# Patient Record
Sex: MALE | Race: BLACK OR AFRICAN AMERICAN | NOT HISPANIC OR LATINO | URBAN - METROPOLITAN AREA
[De-identification: names, ages, dates, MRNs, and addresses within clinical notes are randomized per-mention and may not be internally consistent; named-entity substitution may affect disease eponyms.]

---

## 2023-12-01 ENCOUNTER — EMERGENCY (EMERGENCY)
Facility: HOSPITAL | Age: 28
LOS: 1 days | Discharge: HOME | End: 2023-12-01
Attending: EMERGENCY MEDICINE | Admitting: EMERGENCY MEDICINE
Payer: MEDICAID

## 2023-12-01 VITALS
TEMPERATURE: 98 F | SYSTOLIC BLOOD PRESSURE: 132 MMHG | DIASTOLIC BLOOD PRESSURE: 78 MMHG | OXYGEN SATURATION: 100 % | RESPIRATION RATE: 18 BRPM | HEART RATE: 81 BPM

## 2023-12-01 PROCEDURE — 99284 EMERGENCY DEPT VISIT MOD MDM: CPT

## 2023-12-01 NOTE — ED PROVIDER NOTE - CLINICAL SUMMARY MEDICAL DECISION MAKING FREE TEXT BOX
27 y/o  M hx MDD   Medical evaluation performed. There is no clinical evidence of intoxication or any acute medical problem requiring immediate intervention. Patient is awaiting psychiatric consultation. Final disposition will be determined by psychiatrist.

## 2023-12-01 NOTE — ED ADULT NURSE NOTE - OBJECTIVE STATEMENT
alert oriented calm at present has no complaints denies SI or HI at present   states people at his home are harassing him

## 2023-12-01 NOTE — ED ADULT NURSE NOTE - CHIEF COMPLAINT QUOTE
psych eval    pt from bldg 20 at [facility]. as per police, pt text gf he "made up his mind'.  has history of suicide attempt.. gf itnerpreted that as suicidal ideation. pt states it was about work.  attempted to od several months ago.  denies suicidal homicidal ideation, drugs/etoh, auditory visual hallucinations. past medical history- ptsd, personality disorder, depression

## 2023-12-01 NOTE — ED BEHAVIORAL HEALTH NOTE - BEHAVIORAL HEALTH NOTE
Pt arrives from [facility] (BLDG 20) #[phone]. Writer contacted [facility] and spoke with Melinda Gomez (Care ) who provided the following information:    Pt admitted to respite on 11/7 following hospital discharge.  reports pt hospitalized for SI unaware of details with hx of 4 past SI gestures via cutting and possible ingestion of medication. Pt is due to be d/chao from respite on Monday. Worker reports that pt's psychiatrist outreached  reporting that pt was feeling hopeless in context of upcoming d/c and feeling like nobody cares. Pt known to be otherwise homeless with housing instability. Worker reports that they then received call from pt's girlfriend who reported that pt texted her saying "I made up my mind" and that he loves her and baby (unborn child). Pt then shut off phone and was unable to be contacted. Pt later returned to respite and was said to be closed off to staff. Pt did not want to talk about what happened. Pt was then sent to ED for SI related concerns. Pt would be able to return to respite if cleared for discharge.

## 2023-12-01 NOTE — ED PROVIDER NOTE - OBJECTIVE STATEMENT
29 y/o  M hx MDD  BIBA secondary to concerns overs suicidal ideations.  Collateral from [facility] states that patient verbalized suicidal ideations and send a good bye text to his girlfriend.   Patient denies SI/AH/HI/AV. Denies pain, SOB, fever, chills, chest, abdominal discomfort. Denies falling, or kicking any objects. Denies use of alochol or other  illicit drugs. No evidence of physical injuries, broken skin or deformities.

## 2023-12-01 NOTE — ED ADULT TRIAGE NOTE - CHIEF COMPLAINT QUOTE
psych eval    pt from bldg 20 at [facility]. as per police, pt text gf he "made up his mind'.  has history of suicide attempt.. gf itnerpreted that as suicidal ideation. pt states it was about work.  attempted to od several months ago.  denies suicidal homicidal ideation, drugs/etoh, auditory visual hallucinations. past medical history- ptsd, personality disorder, depression

## 2023-12-01 NOTE — ED PROVIDER NOTE - NSFOLLOWUPINSTRUCTIONS_ED_ALL_ED_FT
You have been given information necessary to follow up with the  [facility] ([facility]) Crisis center & other outpatient  psychiatric clinics within your community    • [facility] walk in Crisis centre  [address] [place] [address] [phone] [email or web address]  Hours of operation:  Day	                                        Hours  Sunday                                  Closed  Monday                                9am - 3pm  Tuesday                                9am - 3pm  Wednesday                          9am - 3pm  Thursday                               9am - 3pm  Friday                                    9am - 3pm  Saturday                                Closed    .....additionally if your current problem is associated with drug or alcohol abuse further information can be obtained at the Drug Abuse Evaluation Health Referral Servce ([facility])    • [facility] [address] [place] [address] [phone] [email or web address]    Additionally if more support and information and help is needed in the area of suicide prevention pleas3 feel free to contact :   • Su[place] [address]  Phone: 8-947-066-BRNT (5260)  Web Address: http://www.suicidepreventionlifeline.org  • Suicide Awareness Voices of Education  [address]  [place], [place]  Phone: 1-570.763.5374  Web Address: http://www.save.org    Depression    WHAT YOU NEED TO KNOW:  Depression is a medical condition that causes feelings of sadness or hopelessness that do not go away. Depression may cause you to lose interest in things you used to enjoy. These feelings may interfere with your daily life.  DISCHARGE INSTRUCTIONS:  Call your local emergency number (911 in the [place]) if:   •You think about harming yourself or someone else.  •You have done something on purpose to hurt yourself.  Call your therapist or doctor if:   •Your symptoms do not improve.  •You cannot make it to your next appointment.   •You have new symptoms.  •You have questions or concerns about your condition or care.  The following resources are available at any time to help you, if needed:   •National Suicide Prevention Lifeline: 1-211.596.9495 (0-091-518-TALK)  •Suicide Hotline: 1-429.465.1396 (4-056-OBNXKNS)  •For a list of international numbers: https://save.org/find-help/international-resources/  Medicines:   •Antidepressants may be given to improve or balance your mood. You may need to take this medicine for several weeks before you begin to feel better.  •Take your medicine as directed. Contact your healthcare provider if you think your medicine is not helping or if you have side effects. Tell him of her if you are allergic to any medicine. Keep a list of the medicines, vitamins, and herbs you take. Include the amounts, and when and why you take them. Bring the list or the pill bottles to follow-up visits. Carry your medicine list with you in case of an emergency.  Therapy is often used together with medicine to relieve depression. Therapy is a way for you to talk about your feelings and anything that may be causing depression. Therapy can be done alone or in a group. It may also be done with family members or a significant other.    Self-care:   •Get regular physical activity. Try to be active for 30 minutes, 3 to 5 days a week. Physical activity can help relieve depression. Work with your healthcare provider to develop a plan that you enjoy. It may help to ask someone to be active with you.  •Create a regular sleep schedule. A routine can help you relax before bed. Listen to music, read, or do yoga. Try to go to bed and wake up at the same time every day. Sleep is important for emotional health.  •Eat a variety of healthy foods. Healthy foods include fruits, vegetables, whole-grain breads, low-fat dairy products, lean meats, fish, and cooked beans. A healthy meal plan is low in fat, salt, and added sugar.  •Do not drink alcohol or use drugs. Alcohol and drugs can make depression worse. Talk to your therapist or doctor if you need help quitting.  Follow up with your healthcare provider as directed: Your healthcare provider will monitor your progress at follow-up visits. He or she will also monitor your medicine if you take antidepressants. Your healthcare provider will ask if the medicine is helping. Tell him or her about any side effects or problems you may have with your medicine. The type or amount of medicine may need to be changed. Write down your questions so you remember to ask them during your visits.    Suicide Prevention  WHAT YOU NEED TO KNOW:  You may see suicide as the only way to escape emotional or physical pain and suffering. Help is available from people who care about you, and from professionals trained in suicide prevention. Prevention includes everything you and others can do to stop you from taking your life.  DISCHARGE INSTRUCTIONS:  Call your local emergency number (911 in the ), or ask someone to call if:   •You do something on purpose to hurt yourself  •You make a plan to commit suicide.  Call your doctor or therapist, or have someone close to you call if:   •You act out in anger, are reckless, or are abusing alcohol or drugs.  •You have serious thoughts of suicide, even with treatment.  •You have more thoughts of suicide when you are alone.  •You stop eating, or you begin to smoke cigarettes or drink alcohol.  •You have questions or concerns about your condition or care.  What to do if you are considering suicide:    •Contact a suicide prevention organization. The following are always available to help you: ?National Suicide Prevention Lifeline: 1-604.113.7970 (3-470-940-TALK)  ?Suicide Hotline: 1-974.439.6600 (6-162-RYVCRHB)  ?For a list of international numbers: https://save.org/find-help/international-resources/  •Contact your therapist. Your doctor can give you a list of therapists if you do not have one.  •Keep medicines, weapons, and alcohol out of your home.  •Do not spend time alone if you have thoughts of ending your life.  Warning signs of suicide: The following can help you and others recognize that you are struggling:   •Talking about your plan for committing suicide, or wanting to read or write about death or suicide  •Cutting yourself, burning your skin with cigarettes, or driving recklessly  •Drug or alcohol use, not taking your prescribed medicine, or taking too much  •Not wanting to spend time with others or doing things you enjoy, feeling bored, or not wanting anyone to praise you  •Changes in your appetite, sleep habits, energy levels, or weight  •Feeling angry, or lashing out at others  •A need to give away or throw away your belonging  •Often skipping work  •Suddenly not taking medicine for a mental illness without talking to your healthcare provider  •Suddenly not going to therapy  Treatment for suicidal thoughts:   •Medicines may be given to prevent mood swings, or to decrease anxiety or depression. You will need to take all medicines as directed. A sudden stop can be harmful. It may take 4 to 6 weeks for the medicine to help you feel better.  •Suicide risk assessment means healthcare providers will ask questions about your suicide thoughts and plans. They will ask how often you think about suicide and if you have tried it before. They will ask if you have begun to hurt yourself, such as with cutting or reckless driving. They may ask if you have access to weapons or drugs.  •A safety plan includes a list of people or groups to contact if you have suicidal feelings again. The list may include friends, family members, a spiritual leader, and others you trust. You may be asked to make a verbal agreement or sign a contract that you will not try to harm yourself.  •A therapist can help you identify and change negative feelings or beliefs about yourself. This may help change the way you feel and act. A therapist can also help you find ways to cope with things that cannot be changed.  Manage depression:   •Get help for drug or alcohol abuse. Drugs and alcohol can make suicidal feelings worse and make you more likely to act on them. Drugs and alcohol can also cause or increase depression.  •Talk to someone you trust. Be honest about your thoughts and feelings about suicide. You can call a suicide prevention center if you do not want to talk to someone you know.  •Exercise as directed. Exercise can lift your mood, give you more energy, and make it easier to sleep.   •Eat a variety of healthy foods. Healthy foods include fruits, vegetables, whole-grain breads, lean meats, fish, low-fat dairy products, and beans. Try to eat regularly even if you do not feel hungry. Depression can increase from a lack of nutrition or if you are hungry for long periods of time.  •Create a sleep routine. Try to go to bed and wake up at the same time every day. Let your healthcare provider know if you are having trouble sleeping.  •Take your medicine and go to therapy as directed. Medicine and therapy can help you manage your mental health. Do not stop taking your medicine without talking to your healthcare provider. If you do not like the way a medicine makes you feel, you may be able to try a different medicine.    **Follow up with your doctor or therapist as directed: Write down your questions so you remember to ask them during your visits.**

## 2023-12-01 NOTE — ED PROVIDER NOTE - PATIENT PORTAL LINK FT
You can access the FollowMyHealth Patient Portal offered by [facility] by registering at the following website: [email or web address]. By joining [employer]’s FollowMyHealth portal, you will also be able to view your health information using other applications (apps) compatible with our system.

## 2023-12-01 NOTE — ED ADULT NURSE NOTE - NSFALLUNIVINTERV_ED_ALL_ED
Bed/Stretcher in lowest position, wheels locked, appropriate side rails in place/Call bell, personal items and telephone in reach/Instruct patient to call for assistance before getting out of bed/chair/stretcher/Non-slip footwear applied when patient is off stretcher/[place] to call system/Physically safe environment - no spills, clutter or unnecessary equipment/Purposeful proactive rounding/Room/bathroom lighting operational, light cord in reach

## 2023-12-02 DIAGNOSIS — F32.A DEPRESSION, UNSPECIFIED: ICD-10-CM

## 2023-12-02 DIAGNOSIS — F43.29 ADJUSTMENT DISORDER WITH OTHER SYMPTOMS: ICD-10-CM

## 2023-12-02 PROCEDURE — 90792 PSYCH DIAG EVAL W/MED SRVCS: CPT

## 2023-12-02 NOTE — ED BEHAVIORAL HEALTH NOTE - BEHAVIORAL HEALTH NOTE
As per Dr. Leija, pt is cleared to return to [facility]. Writer contacted respite at [phone] and spoke with Yas who was informed of pt's discharge. Pt is INDEPENDENT in travels. Writer arranged taxi p/up for pt at this time via [facility] online portal. Trip scheduled with [employer] #[phone] with invoice #[id number]. Verbal huddle occurred with interdisciplinary team.

## 2023-12-02 NOTE — ED BEHAVIORAL HEALTH ASSESSMENT NOTE - OTHER PAST PSYCHIATRIC HISTORY (INCLUDE DETAILS REGARDING ONSET, COURSE OF ILLNESS, INPATIENT/OUTPATIENT TREATMENT)
past psychiatric history of depression and anxiety, with past inpatient psychiatric admissions (as per patient most recently in January of 2023), with past suicide attempts (patient reports history of overdosing on pills and hx of interrupted SA via hanging in the park), history of self harm via cutting (patient reports most recently ~1 year ago, however, respite states he was discharged from a hospital 11/2?), currently follows with outpatient therapist (Dr Jermaine Monzon [phone]; last appointment was today with 2x/wk follow up) and psychiatrist (Dr Batsheva Silva), on Effexor 250mg PO with reported daily compliance

## 2023-12-02 NOTE — ED BEHAVIORAL HEALTH ASSESSMENT NOTE - RISK ASSESSMENT
Patient has chronic elevated risk for self harm given gender, past suicidal ideation / attempts, prior psychiatric hospitalizations, h/o psychiatric diagnosis  Patient also has dynamic risk factors due to active psychiatric sx, recent substance use, unemployment, transitional period, financial difficulty, acute life stressor   Risk factors however are currently mitigated by protective factors of age, race, being in a relationship, no current suicidal ideation, intact reality testing, no substance misuse, future orientation, judgement/ insight/impulse control intact, access to care, intact safety plan, committed to safety plan, denies access to weapons, connected to care , medication compliant.  Given these mitigating factors, the patient does not appear to be at imminent risk for harm to self or others at this time and does not meet criteria for involuntary / emergency inpatient psychiatric hospitalization and does not wish to pursue voluntary admission at this time.

## 2023-12-02 NOTE — ED BEHAVIORAL HEALTH ASSESSMENT NOTE - OTHER
in a relationship psychiatric residence [facility] ([facility]) CHRISTOPHER :  Reference #: [id number] respite staff neutral Sarecycline Counseling: Patient advised regarding possible photosensitivity and discoloration of the teeth, skin, lips, tongue and gums.  Patient instructed to avoid sunlight, if possible.  When exposed to sunlight, patients should wear protective clothing, sunglasses, and sunscreen.  The patient was instructed to call the office immediately if the following severe adverse effects occur:  hearing changes, easy bruising/bleeding, severe headache, or vision changes.  The patient verbalized understanding of the proper use and possible adverse effects of sarecycline.  All of the patient's questions and concerns were addressed. housing staff "sad, overwhelmed, worried"

## 2023-12-02 NOTE — ED BEHAVIORAL HEALTH ASSESSMENT NOTE - SUMMARY
Patient is a 28 y o male, undomiciled (previously staying with his aunt prior to respite), currently staying at [facility] ([facility] 20) until 12/4), in a relationship with significant other who is 15 weeks pregnant with their baby, unemployed (previously employed at [employer] until August), with no past medical history, with past psychiatric history of depression and anxiety, with past inpatient psychiatric admissions (as per patient most recently in January of 2023), with past suicide attempts (patient reports history of overdosing on pills and hx of interrupted SA via hanging in the park), history of self harm via cutting (patient reports most recently ~1 year ago, however, respite states he was discharged from a hospital 11/2?), currently follows with outpatient therapist and psychiatrist, on Effexor 250mg PO with reported daily compliance, who BIBEMS activated by [facility] after girlfriend advised of a text message that she construed to be with suicidal intent.    On evaluation, patient reports recent sad mood and feeling overwhelmed by numerous psychosocial stressors and feeling that he wants to give up on employment search that has been unsuccessful thus far.  Reports this was what he meant to convey to girlfriend when he sent the text message that prompted her to become concerned / alert respite about.  Patient repeatedly denies that text message was meant to convey that he was going to end his life and denies active suicidal / self harming ideation, intent or plan at time of evaluation and engages in safety planning of calling [phone] and returning to the ED should he have active suicidal thoughts; he is future oriented and is able to state reasons for living and does not presents w/ sx of acute depressive episode, neena or psychosis and thus does not meet criteria for involuntary inpatient psychiatric admission at this time and does not wish to pursue voluntary admission at this time.  Patient is advised of [employer] resource to help w/ employment and he reports plans to discuss housing options with his girlfriend tomorrow and follow through on supportive housing application that has been started to as a means to mitigate current stressors.    Recommendations:   -treat and release; no acute psychiatric internvetion indicated at this time  -continue home medications   -continue follow up with established outpatient mental health providers   -return to the ED / call 911/ [phone] / tell respite staff if experienceing suicidal / self harming ideation / intent or plan   -Patient is advised of [employer] resource to help w/ employment and he reports plans to discuss housing options with his girlfriend tomorrow and follow through on supportive housing application that has been started to as a means to mitigate current stressors.

## 2023-12-02 NOTE — ED BEHAVIORAL HEALTH ASSESSMENT NOTE - DESCRIPTION
Alert, calm and cooperative, without agitation or aggressive behavior.     ICU Vital Signs Last 24 Hrs  T(C): 36.8 (01 Dec 2023 22:08), Max: 36.8 (01 Dec 2023 22:08)  T(F): 98.2 (01 Dec 2023 22:08), Max: 98.2 (01 Dec 2023 22:08)  HR: 81 (01 Dec 2023 22:08) (81 - 81)  BP: 132/78 (01 Dec 2023 22:08) (132/78 - 132/78)  BP(mean): --  ABP: --  ABP(mean): --  RR: 18 (01 Dec 2023 22:08) (18 - 18)  SpO2: 100% (01 Dec 2023 22:08) (100% - 100%)    O2 Parameters below as of 01 Dec 2023 23:28  Patient On (Oxygen Delivery Method): room air n/a as per HPI

## 2023-12-02 NOTE — ED BEHAVIORAL HEALTH ASSESSMENT NOTE - HPI (INCLUDE ILLNESS QUALITY, SEVERITY, DURATION, TIMING, CONTEXT, MODIFYING FACTORS, ASSOCIATED SIGNS AND SYMPTOMS)
Patient is a 28 y o male, undomiciled [facility] currently staying at [facility] ([facility] until 12/4), in a relationship with significant other who is 15 weeks pregnant with their baby, unemployed (previously employed at [employer] until August), with no past medical history, with past psychiatric history of depression and anxiety, with past inpatient psychiatric admissions (as per patient most recently in January of 2023), with past suicide attempts (patient reports history of overdosing on pills and hx of interrupted SA via hanging in the park), history of self harm via cutting (patient reports most recently ~1 year ago), currently follows with outpatient therapist and psychiatrist, on Effexor 250mg PO with reported daily compliance, who BIBEMS activated by [facility] after girlfriend advised of a text message that she construed to be with suicidal intent.     On evaluation, patient is alert, calm and cooperative, initially guarded but more engaged and forthcoming as the evaluation progresses.  He reports that he sent a text message to his girlfriend that says "made up my mind."  Reports he did not have suicidal intent or plan with this text message and he was overwhelmed by recent financial, employment and housing stressors in context of a baby on the way and that he has been unsuccessful in finding employment despite ongoing efforts with interviews and sending out his resume.  Reports he felt like giving up and no longer putting in the effort in the job search particularly and that he did not mean that he was going to do something to end his life.  Reports he was feeling too overwhelmed and stressed to talk to his girlfriend after sending the text and that he then got on the subway and lost cell service after picking up his medications and that is the reason his partner was unable to reach him.  Reports that his mood in the last two weeks has been "drained, overwhelmed, sad, exhausted and worried" and attributes this to ongoing stressors around employment noted above.  Denies that mood has interfered with functioning, reports energy/concentration/Reports he has been having thoughts that he doesn't want to wake up but denies thoughts of wanting to end his life or harm himself.  Reports going to the gym, calling [phone], and reaching out to his therapist has been helpful with the thoughts. Patient is a 28 y o male, [facility]iciled [facility] at [facility] ([facility] until 12/4), in a relationship with significant other who is 15 weeks pregnant with their baby, unemployed (previously employed at [employer] until August), with no past medical history, with past psychiatric history of depression and anxiety, with past inpatient psychiatric admissions (as per patient most recently in January of 2023), with past suicide attempts (patient reports history of overdosing on pills and hx of interrupted SA via hanging in the park), history of self harm via cutting (patient reports most recently ~1 year ago), currently follows with outpatient therapist and psychiatrist, on Effexor 250mg PO with reported daily compliance, who BIBEMS activated by [facility] after girlfriend advised of a text message that she construed to be with suicidal intent.     On evaluation, patient is alert, calm and cooperative, initially guarded but more engaged and forthcoming as the evaluation progresses.  He reports that he sent a text message to his girlfriend that says "made up my mind."  Reports he did not have suicidal intent or plan with this text message and he was overwhelmed by recent financial, employment and housing stressors in context of a baby on the way and that he has been unsuccessful in finding employment despite ongoing efforts with interviews and sending out his resume.  Reports he felt like giving up and no longer putting in the effort in the job search particularly and that he did not mean that he was going to do something to end his life.  Reports he was feeling too overwhelmed and stressed to talk to his girlfriend after sending the text and that he then got on the subway and lost cell service after picking up his medications and that is the reason his partner was unable to reach him.  Reports that his mood in the last two weeks has been "drained, overwhelmed, sad, exhausted and worried" and attributes this to ongoing stressors around employment noted above.  Denies that mood has interfered with functioning, and continues to care for himself at baseline, reports energy/concentration/appetite at baseline.  Denies anhedonia or psychomotor retardation. Reports he has been having thoughts that he doesn't want to wake up but denies thoughts of wanting to end his life or harm himself.  Reports going to the gym, calling [phone], and reaching out to his therapist has been helpful with the thoughts.  Denies suicidal or self harming ideation, intent or plan at time of evaluation and reports that if that changes Patient is a 28 y o male, undomiciled (previously staying with his aunt prior to respite), currently staying at [facility] ([facility]) until 12/4), in a relationship with significant other who is 15 weeks pregnant with their baby, unemployed (previously employed at [employer] until August), with no past medical history, with past psychiatric history of depression and anxiety, with past inpatient psychiatric admissions (as per patient most recently in January of 2023), with past suicide attempts (patient reports history of overdosing on pills and hx of interrupted SA via hanging in the park), history of self harm via cutting (patient reports most recently ~1 year ago, however, respite states he was discharged from a hospital 11/2?), currently follows with outpatient therapist and psychiatrist, on Effexor 250mg PO with reported daily compliance, who BIBEMS activated by [facility] after girlfriend advised of a text message that she construed to be with suicidal intent.     On evaluation, patient is alert, calm and cooperative, initially guarded but more engaged and forthcoming as the evaluation progresses.  He reports that he sent a text message to his girlfriend that says "made up my mind."  Reports he did not have suicidal intent or plan with this text message and he was overwhelmed by recent financial, employment and housing stressors in context of a baby on the way and that he has been unsuccessful in finding employment despite ongoing efforts with interviews and sending out his resume.  Reports he felt like giving up and no longer putting in the effort in the job search particularly and that he did not mean that he was going to do something to end his life.  Reports he was feeling too overwhelmed and stressed to talk to his girlfriend after sending the text and that he then got on the subway and lost cell service after picking up his medication and that is the reason his partner was unable to reach him.  Repeatedly denies that text message was meant to convey that he was going to end his life. Reports that his mood in the last two weeks has been "drained, overwhelmed, sad, exhausted and worried" and attributes this to ongoing stressors around employment noted above.  Denies that mood has interfered with functioning, and continues to care for himself at baseline, reports energy/concentration/appetite/sleep at baseline.  Denies anhedonia or psychomotor retardation. Reports he has been having thoughts that he doesn't want to wake up but denies thoughts of wanting to end his life or harm himself.  Reports going to the gym, calling [phone], and reaching out to his therapist has been helpful with the thoughts.  Denies suicidal or self harming ideation, intent or plan at time of evaluation and reports that if that changes he would return to the emergency room as he feels he had a positive experience with writer and this ER setting compared to past experiences.  Reports that he has signed himself in voluntarily in the past but does not feel he needs to at this time.  Reports he wants to discussed housing options with his girlfriend tomorrow and where he will go after he is d/chao from respite, reports does not want to return w/ aunt as she is triggering for him and his suicadlity and reports that supportive housing option has to be finalized still.  Also reports that there is a gender reveal party for his baby tomorrow and he plans and is looking forward to going to that.  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  Denies sx of acute neena, psychosis.  Denies substance use.

## 2023-12-02 NOTE — ED BEHAVIORAL HEALTH ASSESSMENT NOTE - SAFETY PLAN ADDT'L DETAILS
Safety plan discussed with.../Education provided regarding environmental safety / lethal means restriction/Provision of National Suicide Prevention Lifeline 2-572-713-CWYS (3179)

## 2023-12-02 NOTE — ED BEHAVIORAL HEALTH NOTE - BEHAVIORAL HEALTH NOTE
Writer met with pt on unit to obtain number for significant other. Pt hesitant at first reports number only in phone. PCA staff assisted writer in obtaining pt phone. Pt provided number for significant other, Kati, at [phone]. Phone return to staff to be placed in belonging bag. Writer then contacted pt's significant other who provided the following information:     Significant other upset by situation. She states pt "can do whatever he wants to do at this point". Pt reported to be homeless. Pt is unemployed. Pt with hx of mental illness. Pt and s/o expecting first child together in May of 2024. Significant other says she texted pt asking if he was still downtown today. Pt said he was by [facility]. S/o asked why and pt said he was picking up his medication. Pt then 20 minutes later said can I come by I need you. Pt then said never mind I am not coming. Pt after said I made up my mind and that I love both of you. Pt then did not answer after s/o sent numerous text. S/o reports that she then called the respite and they said were going to possibly do a missing person report. S/o reports that there has been numerous times before ("lost count") that she has had to call the police or file a missing persons report because pt  stopped answering. S/o thinks pt went to hospital in November but was not admitted. S/o knows pt has cut himself in the past. S/o said last time was 2 months ago. S/o confirms superficial says saw it on pt's arm. S/o reports that pt has not mentioned SI in last 1-2 weeks. No reported HI. No hx of violence or aggression. No AH/VH reported. Pt does have to leave respite soon with no other secured housing arrangements. No substance use reported. Girlfriend reports pt attends tx regularly at [facility].

## 2023-12-02 NOTE — ED BEHAVIORAL HEALTH ASSESSMENT NOTE - NS ED BHA PLAN TR BH CONTACTED FT
left VM for patient's therapistDr Jermaine Monzon [phone])requesting more urgent next appointment and to convery same to patient's psychiatrist; conveyed same to [facility] staff

## 2024-05-15 NOTE — ED ADULT NURSE NOTE - CAS ELECT INFOMATION PROVIDED
Goal Outcome Evaluation:  Plan of Care Reviewed With: patient           Outcome Evaluation: Upon entering room, pt. supine in bed, awake/alert, and agreeable to work with P.T. this date despite c/o fatigue and weakness.  Pt. requires Mod-Max. assist x 2 for bed mobility and Max. assist x 2 for sit <-> stand transfers.  Pt. performed sit <-> stand transfers x 2 reps for functional strength training with Right knee blocked for safety.  Pt. performed weight shifting while standing x 10 reps during each stance.  Pt. remains unable to attempt further transfers due to overall weakness and Right knee buckling.  BLE ther. ex. program x 10 reps completed for general strengthening.  Will continue to progress functional mobility as tolerated.                                DC instructions